# Patient Record
Sex: MALE | Race: BLACK OR AFRICAN AMERICAN | NOT HISPANIC OR LATINO | URBAN - METROPOLITAN AREA
[De-identification: names, ages, dates, MRNs, and addresses within clinical notes are randomized per-mention and may not be internally consistent; named-entity substitution may affect disease eponyms.]

---

## 2024-07-14 ENCOUNTER — HOSPITAL ENCOUNTER (EMERGENCY)
Facility: HOSPITAL | Age: 54
Discharge: HOME | End: 2024-07-14
Attending: EMERGENCY MEDICINE
Payer: COMMERCIAL

## 2024-07-14 VITALS
WEIGHT: 225 LBS | OXYGEN SATURATION: 95 % | HEART RATE: 78 BPM | SYSTOLIC BLOOD PRESSURE: 157 MMHG | TEMPERATURE: 99 F | BODY MASS INDEX: 34.1 KG/M2 | HEIGHT: 68 IN | RESPIRATION RATE: 17 BRPM | DIASTOLIC BLOOD PRESSURE: 96 MMHG

## 2024-07-14 DIAGNOSIS — R07.9 CHEST PAIN: ICD-10-CM

## 2024-07-14 DIAGNOSIS — R05.9 COUGH: ICD-10-CM

## 2024-07-14 DIAGNOSIS — U07.1 COVID-19 VIRUS DETECTED: ICD-10-CM

## 2024-07-14 DIAGNOSIS — U07.1 COVID-19: Primary | ICD-10-CM

## 2024-07-14 LAB
INFLUENZA A, MOLECULAR: NEGATIVE
INFLUENZA B, MOLECULAR: NEGATIVE
SARS-COV-2 RDRP RESP QL NAA+PROBE: POSITIVE
SPECIMEN SOURCE: NORMAL

## 2024-07-14 PROCEDURE — 87502 INFLUENZA DNA AMP PROBE: CPT | Performed by: EMERGENCY MEDICINE

## 2024-07-14 PROCEDURE — 93010 ELECTROCARDIOGRAM REPORT: CPT | Mod: ,,, | Performed by: INTERNAL MEDICINE

## 2024-07-14 PROCEDURE — 93005 ELECTROCARDIOGRAM TRACING: CPT

## 2024-07-14 PROCEDURE — 25000003 PHARM REV CODE 250: Performed by: EMERGENCY MEDICINE

## 2024-07-14 PROCEDURE — 99285 EMERGENCY DEPT VISIT HI MDM: CPT | Mod: 25

## 2024-07-14 PROCEDURE — U0002 COVID-19 LAB TEST NON-CDC: HCPCS | Performed by: EMERGENCY MEDICINE

## 2024-07-14 RX ORDER — BENZONATATE 100 MG/1
100 CAPSULE ORAL 3 TIMES DAILY PRN
Qty: 20 CAPSULE | Refills: 0 | Status: SHIPPED | OUTPATIENT
Start: 2024-07-14 | End: 2024-07-14 | Stop reason: CLARIF

## 2024-07-14 RX ORDER — ACETAMINOPHEN 325 MG/1
650 TABLET ORAL
Status: COMPLETED | OUTPATIENT
Start: 2024-07-14 | End: 2024-07-14

## 2024-07-14 RX ORDER — BENZONATATE 100 MG/1
100 CAPSULE ORAL 3 TIMES DAILY PRN
Qty: 20 CAPSULE | Refills: 0 | Status: SHIPPED | OUTPATIENT
Start: 2024-07-14 | End: 2024-07-24

## 2024-07-14 RX ADMIN — ACETAMINOPHEN 650 MG: 325 TABLET ORAL at 10:07

## 2024-07-14 NOTE — ED PROVIDER NOTES
"Chief Complaint   Generalized Body Aches and Cough (Patient endorsing multiple complaints. The patient states he is having a postnasal drip, with a productive cough (clear mucus), fever, body aches, and generalized weakness and fatigue for the past few weeks however the symptoms starting worsening Friday evening. Patient also endorsing nausea.)      History Of Present Illness   Bill Gamble is a 54 y.o. male presenting with fever, chills, body aches, nasal congestion with rhinorrhea, frequent cough and sore throat that has been present for 3 days.  No nausea or vomiting.  He has some chest discomfort when he coughs or moves in a certain way, but nothing at baseline.  The discomfort is fleeting, lasting a second or 2.  He does not feel short of breath at rest, but does feel short of breath when he coughs.  No sick contacts.  He recently flew in from [place], where he lives.        Review of patient's allergies indicates:  No Known Allergies    No current facility-administered medications on file prior to encounter.     No current outpatient medications on file prior to encounter.       Past History   As per HPI and below:  No past medical history on file.  No past surgical history on file.    Social History     Tobacco Use    Smoking status: Never    Smokeless tobacco: Never   Substance Use Topics    Alcohol use: Not Currently     Comment: social drinker    Drug use: Yes     Types: Marijuana       No family history on file.    Physical Exam     Vitals:    07/14/24 0958 07/14/24 1142   BP: (!) 161/109 (!) 157/96   BP Location: Right arm    Pulse: 96 78   Resp: 17    Temp: 99.4 °F (37.4 °C)    TempSrc: Oral    SpO2: 98% 95%   Weight: 102.1 kg (225 lb)    Height: 5' 8" (1.727 m)      Appearance: No acute distress.  Skin: No rashes seen.  Good turgor.  No abrasions.  No ecchymoses.  Eyes: No conjunctival injection.  ENT: Oropharynx clear.    Chest: Clear to auscultation bilaterally.  Good air movement.  No wheezes.  No " rhonchi.  Cardiovascular: Regular rate and rhythm.  No murmurs. No gallops. No rubs.  Abdomen: Soft.  Not distended.  Nontender.  No guarding.  No rebound.  Musculoskeletal: Good range of motion all joints.  No deformities.  Neck supple.  No meningismus.  Neurologic: Motor intact.  Sensation intact.  Cranial nerves intact.  Mental Status:  Alert and oriented x 3.  Appropriate, conversant.      Initial MDM   Constellation of symptoms consistent with viral syndrome.  Patient has a cough and his throat looks benign; I doubt his sensation of sore throat represents strep and do not think testing is indicated.  His chest pain is consistent with musculoskeletal pain from coughing and I do not think additional workup beyond EKG is needed. I will check viral swabs and a chest x-ray given his age.  His vitals are stable.  I see no indication for blood work.    Medications Given     Medications   acetaminophen tablet 650 mg (650 mg Oral Given 7/14/24 1036)       Results and Course     Labs Reviewed   SARS-COV-2 RNA AMPLIFICATION, QUAL - Abnormal; Notable for the following components:       Result Value    SARS-CoV-2 RNA, Amplification, Qual Positive (*)     All other components within normal limits   INFLUENZA A & B BY MOLECULAR       Imaging Results              X-Ray Chest PA And Lateral (Final result)  Result time 07/14/24 11:32:42      Final result by Kiera Harding MD (07/14/24 11:32:42)                   Impression:      Upper normal sized cardiac silhouette.    Clear lungs.      Electronically signed by: Kiera Harding MD  Date:    07/14/2024  Time:    11:32               Narrative:    EXAMINATION:  XR CHEST PA AND LATERAL    CLINICAL HISTORY:  Cough, unspecified    TECHNIQUE:  PA and lateral views of the chest were performed.    COMPARISON:  None    FINDINGS:  The mediastinal structures are midline.  The cardiac silhouette is upper normal.  There is no evidence of acute pulmonary disease, pleural disease,  lymph node enlargement, or cardiac decompensation.  No osseous abnormalities are identified.                                      ED Course as of 07/14/24 1157   Sun Jul 14, 2024   1024 EKG 12-lead  EKG shows normal sinus rhythm and no acute ischemia per my independent interpretation.     [DC]   1119 X-Ray Chest PA And Lateral  CXR shows no acute disease per my independent interpretation.     [DC]   1137 SARS-CoV-2 RNA, Amplification, Qual(!): Positive [DC]   1137 Influenza A, Molecular: Negative [DC]   1137 Influenza B, Molecular: Negative [DC]      ED Course User Index  [DC] Celestino Dale MD               MDM, Impression and Plan   54 y.o. male with viral syndrome, COVID-19.  Chest x-ray does not show any pneumonia.  Oxygen saturations are excellent.  Given his duration of symptoms, would not benefit from Paxlovid.  Will discharge with supportive care, Tessalon, follow-up PCP.         Final diagnoses:  [R07.9] Chest pain  [R05.9] Cough  [U07.1] COVID-19 (Primary)        ED Disposition Condition    Discharge Stable          ED Prescriptions       Medication Sig Dispense Start Date End Date Auth. Provider    benzonatate (TESSALON) 100 MG capsule  (Status: Discontinued) Take 1 capsule (100 mg total) by mouth 3 (three) times daily as needed for Cough. 20 capsule 7/14/2024 7/14/2024 Celestino Dale MD    benzonatate (TESSALON) 100 MG capsule Take 1 capsule (100 mg total) by mouth 3 (three) times daily as needed for Cough. 20 capsule 7/14/2024 7/24/2024 Celestino Dale MD          Follow-up Information       Follow up With Specialties Details Why Contact Info    Your primary care doctor  In 2 weeks                 Celestino Dale MD  07/14/24 7635

## 2024-07-14 NOTE — ED TRIAGE NOTES
Bill Gamble, a 54 y.o. male presents to the ED w/ complaint of generalized body aches, cold and flu like symptoms and cough. Pt endorses fever, sweating, nasal congestion and abdominal pain and body aches. [place] and symptoms began on Thursday. Pt states he also has a productive cough with yellow/green sputum. Pt's family member states that he is not having normal spit and this morning he also had blood tinged sputum enough to cover a towel. Pt also complains of chest pain and shortness of breath.     Triage note:  Chief Complaint   Patient presents with    Generalized Body Aches    Cough     Patient endorsing multiple complaints. The patient states he is having a postnasal drip, with a productive cough (clear mucus), fever, body aches, and generalized weakness and fatigue for the past few weeks however the symptoms starting worsening Friday evening. Patient also endorsing nausea.     Review of patient's allergies indicates:  Not on File  No past medical history on file.

## 2024-07-15 LAB
OHS QRS DURATION: 86 MS
OHS QTC CALCULATION: 428 MS